# Patient Record
Sex: MALE | Race: WHITE | Employment: OTHER | URBAN - METROPOLITAN AREA
[De-identification: names, ages, dates, MRNs, and addresses within clinical notes are randomized per-mention and may not be internally consistent; named-entity substitution may affect disease eponyms.]

---

## 2017-03-02 PROBLEM — R51.9 NONINTRACTABLE EPISODIC HEADACHE: Status: RESOLVED | Noted: 2017-03-02 | Resolved: 2017-03-02

## 2017-03-02 PROBLEM — R51.9 NONINTRACTABLE EPISODIC HEADACHE: Status: ACTIVE | Noted: 2017-03-02

## 2017-03-03 PROBLEM — K52.9 GASTROENTERITIS: Status: ACTIVE | Noted: 2017-03-03

## 2019-09-23 ENCOUNTER — TELEPHONE (OUTPATIENT)
Dept: CARDIOLOGY CLINIC | Age: 59
End: 2019-09-23

## 2019-10-08 ENCOUNTER — TELEPHONE (OUTPATIENT)
Dept: ADMINISTRATIVE | Age: 59
End: 2019-10-08

## 2022-04-15 ENCOUNTER — APPOINTMENT (OUTPATIENT)
Dept: GENERAL RADIOLOGY | Age: 62
End: 2022-04-15
Payer: COMMERCIAL

## 2022-04-15 ENCOUNTER — HOSPITAL ENCOUNTER (EMERGENCY)
Age: 62
Discharge: HOME | End: 2022-04-15
Attending: STUDENT IN AN ORGANIZED HEALTH CARE EDUCATION/TRAINING PROGRAM
Payer: COMMERCIAL

## 2022-04-15 VITALS
OXYGEN SATURATION: 97 % | HEART RATE: 68 BPM | SYSTOLIC BLOOD PRESSURE: 155 MMHG | DIASTOLIC BLOOD PRESSURE: 94 MMHG | RESPIRATION RATE: 20 BRPM | TEMPERATURE: 98.3 F

## 2022-04-15 DIAGNOSIS — R06.02 SHORTNESS OF BREATH: ICD-10-CM

## 2022-04-15 DIAGNOSIS — R07.9 CHEST PAIN, UNSPECIFIED TYPE: Primary | ICD-10-CM

## 2022-04-15 LAB
ALBUMIN SERPL-MCNC: 4.6 G/DL (ref 3.5–5.2)
ALP BLD-CCNC: 66 U/L (ref 40–129)
ALT SERPL-CCNC: 53 U/L (ref 0–40)
ANION GAP SERPL CALCULATED.3IONS-SCNC: 16 MMOL/L (ref 7–16)
AST SERPL-CCNC: 29 U/L (ref 0–39)
BASOPHILS ABSOLUTE: 0.06 E9/L (ref 0–0.2)
BASOPHILS RELATIVE PERCENT: 0.7 % (ref 0–2)
BILIRUB SERPL-MCNC: 0.7 MG/DL (ref 0–1.2)
BUN BLDV-MCNC: 15 MG/DL (ref 6–23)
CALCIUM SERPL-MCNC: 9.6 MG/DL (ref 8.6–10.2)
CHLORIDE BLD-SCNC: 101 MMOL/L (ref 98–107)
CO2: 20 MMOL/L (ref 22–29)
CREAT SERPL-MCNC: 1.2 MG/DL (ref 0.7–1.2)
D DIMER: <200 NG/ML DDU
EKG ATRIAL RATE: 72 BPM
EKG P AXIS: 50 DEGREES
EKG P-R INTERVAL: 180 MS
EKG Q-T INTERVAL: 402 MS
EKG QRS DURATION: 86 MS
EKG QTC CALCULATION (BAZETT): 440 MS
EKG R AXIS: 6 DEGREES
EKG T AXIS: -15 DEGREES
EKG VENTRICULAR RATE: 72 BPM
EOSINOPHILS ABSOLUTE: 0.12 E9/L (ref 0.05–0.5)
EOSINOPHILS RELATIVE PERCENT: 1.5 % (ref 0–6)
GFR AFRICAN AMERICAN: >60
GFR NON-AFRICAN AMERICAN: >60 ML/MIN/1.73
GLUCOSE BLD-MCNC: 132 MG/DL (ref 74–99)
HCT VFR BLD CALC: 41.9 % (ref 37–54)
HEMOGLOBIN: 14.7 G/DL (ref 12.5–16.5)
IMMATURE GRANULOCYTES #: 0.03 E9/L
IMMATURE GRANULOCYTES %: 0.4 % (ref 0–5)
LYMPHOCYTES ABSOLUTE: 2.86 E9/L (ref 1.5–4)
LYMPHOCYTES RELATIVE PERCENT: 35.5 % (ref 20–42)
MCH RBC QN AUTO: 29.3 PG (ref 26–35)
MCHC RBC AUTO-ENTMCNC: 35.1 % (ref 32–34.5)
MCV RBC AUTO: 83.5 FL (ref 80–99.9)
MONOCYTES ABSOLUTE: 0.52 E9/L (ref 0.1–0.95)
MONOCYTES RELATIVE PERCENT: 6.5 % (ref 2–12)
NEUTROPHILS ABSOLUTE: 4.46 E9/L (ref 1.8–7.3)
NEUTROPHILS RELATIVE PERCENT: 55.4 % (ref 43–80)
PDW BLD-RTO: 12.5 FL (ref 11.5–15)
PLATELET # BLD: 214 E9/L (ref 130–450)
PMV BLD AUTO: 10 FL (ref 7–12)
POTASSIUM SERPL-SCNC: 4 MMOL/L (ref 3.5–5)
PRO-BNP: 29 PG/ML (ref 0–125)
RBC # BLD: 5.02 E12/L (ref 3.8–5.8)
SODIUM BLD-SCNC: 137 MMOL/L (ref 132–146)
TOTAL PROTEIN: 7 G/DL (ref 6.4–8.3)
TROPONIN, HIGH SENSITIVITY: 7 NG/L (ref 0–11)
TROPONIN, HIGH SENSITIVITY: 8 NG/L (ref 0–11)
WBC # BLD: 8.1 E9/L (ref 4.5–11.5)

## 2022-04-15 PROCEDURE — 93005 ELECTROCARDIOGRAM TRACING: CPT | Performed by: PHYSICIAN ASSISTANT

## 2022-04-15 PROCEDURE — 36415 COLL VENOUS BLD VENIPUNCTURE: CPT

## 2022-04-15 PROCEDURE — 84484 ASSAY OF TROPONIN QUANT: CPT

## 2022-04-15 PROCEDURE — 85025 COMPLETE CBC W/AUTO DIFF WBC: CPT

## 2022-04-15 PROCEDURE — 93010 ELECTROCARDIOGRAM REPORT: CPT | Performed by: INTERNAL MEDICINE

## 2022-04-15 PROCEDURE — 71046 X-RAY EXAM CHEST 2 VIEWS: CPT

## 2022-04-15 PROCEDURE — 93005 ELECTROCARDIOGRAM TRACING: CPT | Performed by: STUDENT IN AN ORGANIZED HEALTH CARE EDUCATION/TRAINING PROGRAM

## 2022-04-15 PROCEDURE — 80053 COMPREHEN METABOLIC PANEL: CPT

## 2022-04-15 PROCEDURE — 83880 ASSAY OF NATRIURETIC PEPTIDE: CPT

## 2022-04-15 PROCEDURE — 6360000002 HC RX W HCPCS: Performed by: STUDENT IN AN ORGANIZED HEALTH CARE EDUCATION/TRAINING PROGRAM

## 2022-04-15 PROCEDURE — 99283 EMERGENCY DEPT VISIT LOW MDM: CPT

## 2022-04-15 PROCEDURE — 85378 FIBRIN DEGRADE SEMIQUANT: CPT

## 2022-04-15 PROCEDURE — 96374 THER/PROPH/DIAG INJ IV PUSH: CPT

## 2022-04-15 RX ORDER — IBUPROFEN 800 MG/1
800 TABLET ORAL 3 TIMES DAILY PRN
Qty: 40 TABLET | Refills: 0 | Status: SHIPPED | OUTPATIENT
Start: 2022-04-15

## 2022-04-15 RX ORDER — KETOROLAC TROMETHAMINE 30 MG/ML
15 INJECTION, SOLUTION INTRAMUSCULAR; INTRAVENOUS ONCE
Status: COMPLETED | OUTPATIENT
Start: 2022-04-15 | End: 2022-04-15

## 2022-04-15 RX ADMIN — KETOROLAC TROMETHAMINE 15 MG: 30 INJECTION, SOLUTION INTRAMUSCULAR at 22:08

## 2022-04-15 ASSESSMENT — PAIN SCALES - GENERAL
PAINLEVEL_OUTOF10: 2
PAINLEVEL_OUTOF10: 6

## 2022-04-15 ASSESSMENT — PAIN - FUNCTIONAL ASSESSMENT: PAIN_FUNCTIONAL_ASSESSMENT: 0-10

## 2022-04-15 NOTE — ED NOTES
FIRST PROVIDER CONTACT ASSESSMENT NOTE           Department of Emergency Medicine                 First Provider Note            4/15/22  1:53 PM EDT    Date of Encounter: No admission date for patient encounter. Patient Name: Maya Gunn  : 1960  MRN: [id number]    Chief Complaint: No chief complaint on file. History of Present Illness:   Maya Gunn is a 64 y.o. male who presents to the ED for chest pain that started one month ago, worse last night. Reports shortness of breath as well. Focused Physical Exam:  VS:    ED Triage Vitals [04/15/22 1350]   BP Temp Temp src Pulse Resp SpO2 Height Weight   -- -- -- 78 -- 97 % -- --        Physical Ex: Constitutional: Alert and non-toxic. Medical History:  has a past medical history of Concussion, Kidney stone, and Umbilical hernia. Surgical History:  has a past surgical history that includes Colonoscopy; colectomy (2016); and hernia repair (2016). Social History:  reports that he has never smoked. He has quit using smokeless tobacco. He reports current alcohol use. He reports that he does not use drugs. Family History: family history is not on file. Allergies: Patient has no known allergies.      Initial Plan of Care: Initiate Treatment-Testing, Proceed toTreatment Area When Bed Available for ED Attending/MLP to Continue Care      ---END OF FIRST PROVIDER CONTACT ASSESSMENT NOTE---  Electronically signed by ORESTES Manzanares   DD: 4/15/22       ORESTES Manzanares  04/15/22 [id number]

## 2022-04-16 NOTE — ED PROVIDER NOTES
Department of Emergency Medicine   ED  Provider Note  Admit Date/RoomTime: 4/15/2022  7:36 PM  ED Room: 10/10    History of Present Illness:  4/15/22, Time: 8:01 PM EDT  Chief Complaint   Patient presents with    Chest Pain     cp/sob x 1 month started getting worse lastnight . denies any cardiac hx     Shortness of Breath     Sean Padilla is a 64 y.o. male presenting to the ED for Chest pain with shortness of breath. This started in January. Has been worse for the past month. It is exertional.  He has associated chest tightness on the left side. Nothing is better or worse. At however worse most recently last week when he was in [place] with exertion while he was mowing the lawn. He has to rest frequently because of this. Patient denies any nausea or emesis with it. He does have diaphoresis with it. Patient has no cardiac history however he is a diabetic. Of note, the patient has no history of DVT or PE in the past.  No hemoptysis. No leg swelling. There is no history of tobacco use or hormone use    Review of Systems:  Review of systems obtained and negative unless stated otherwise above in the HPI.    --------------------------------------------- PAST HISTORY ---------------------------------------------  Past Medical History:  has a past medical history of Concussion, Kidney stone, and Umbilical hernia. Past Surgical History:  has a past surgical history that includes Colonoscopy; colectomy (01/19/2016); and hernia repair (01/19/2016). Social History:  reports that he has never smoked. He has quit using smokeless tobacco. He reports current alcohol use. He reports that he does not use drugs. Family History: family history is not on file. . Unless otherwise noted, family history is non contributory    The patients home medications have been reviewed. Allergies: Patient has no known allergies.     I have reviewed the past medical history, past surgical history, social history, and family history    ---------------------------------------------------PHYSICAL EXAM--------------------------------------    Constitutional: Appears in no distress  Head: Normocephalic, atraumatic  Eyes: Non-icteric slcera, no conjunctival injection  ENT: Moist mucous membranes,  Neck: Trachea midline, no JVD  Respiratory: Nonlabored respirations. Lungs clear to auscultation bilaterally, no wheezes, rales, or rhonchi. Cardiovascular: Regular rate. Regular rhythm. No murmurs, no gallops, no rubs. Gastrointestinal: Abdomen Soft, Non tender, Non distended. No rebound tenderness, guarding, or rigidity. Extremities: No lower extremity edema  Genitourinary: No CVA tenderness, no suprapubic tenderness  Musculoskeletal: Moves all extremities, no deformity  Skin: Pink, warm, dry without rash. Neurologic: Alert, symmetric facies, no aphasia    -------------------------------------------------- RESULTS -------------------------------------------------  I have personally reviewed all laboratory and imaging results for this patient. Results are listed below.      LABS: (Lab results interpreted by me)  Results for orders placed or performed during the hospital encounter of 04/15/22   CBC with Auto Differential   Result Value Ref Range    WBC 8.1 4.5 - 11.5 E9/L    RBC 5.02 3.80 - 5.80 E12/L    Hemoglobin 14.7 12.5 - 16.5 g/dL    Hematocrit 41.9 37.0 - 54.0 %    MCV 83.5 80.0 - 99.9 fL    MCH 29.3 26.0 - 35.0 pg    MCHC 35.1 (H) 32.0 - 34.5 %    RDW 12.5 11.5 - 15.0 fL    Platelets 640 685 - 893 E9/L    MPV 10.0 7.0 - 12.0 fL    Neutrophils % 55.4 43.0 - 80.0 %    Immature Granulocytes % 0.4 0.0 - 5.0 %    Lymphocytes % 35.5 20.0 - 42.0 %    Monocytes % 6.5 2.0 - 12.0 %    Eosinophils % 1.5 0.0 - 6.0 %    Basophils % 0.7 0.0 - 2.0 %    Neutrophils Absolute 4.46 1.80 - 7.30 E9/L    Immature Granulocytes # 0.03 E9/L    Lymphocytes Absolute 2.86 1.50 - 4.00 E9/L    Monocytes Absolute 0.52 0.10 - 0.95 E9/L    Eosinophils Absolute 0. 12 0.05 - 0.50 E9/L    Basophils Absolute 0.06 0.00 - 0.20 E9/L   Comprehensive Metabolic Panel   Result Value Ref Range    Sodium 137 132 - 146 mmol/L    Potassium 4.0 3.5 - 5.0 mmol/L    Chloride 101 98 - 107 mmol/L    CO2 20 (L) 22 - 29 mmol/L    Anion Gap 16 7 - 16 mmol/L    Glucose 132 (H) 74 - 99 mg/dL    BUN 15 6 - 23 mg/dL    CREATININE 1.2 0.7 - 1.2 mg/dL    GFR Non-African American >60 >=60 mL/min/1.73    GFR African American >60     Calcium 9.6 8.6 - 10.2 mg/dL    Total Protein 7.0 6.4 - 8.3 g/dL    Albumin 4.6 3.5 - 5.2 g/dL    Total Bilirubin 0.7 0.0 - 1.2 mg/dL    Alkaline Phosphatase 66 40 - 129 U/L    ALT 53 (H) 0 - 40 U/L    AST 29 0 - 39 U/L   Troponin   Result Value Ref Range    Troponin, High Sensitivity 7 0 - 11 ng/L   Brain Natriuretic Peptide   Result Value Ref Range    Pro-BNP 29 0 - 125 pg/mL   Troponin   Result Value Ref Range    Troponin, High Sensitivity 8 0 - 11 ng/L   D-Dimer, Quantitative   Result Value Ref Range    D-Dimer, Quant <200 ng/mL DDU   EKG 12 Lead   Result Value Ref Range    Ventricular Rate 72 BPM    Atrial Rate 72 BPM    P-R Interval 180 ms    QRS Duration 86 ms    Q-T Interval 402 ms    QTc Calculation (Bazett) 440 ms    P Axis 50 degrees    R Axis 6 degrees    T Axis -15 degrees   EKG 12 Lead   Result Value Ref Range    Ventricular Rate 73 BPM    Atrial Rate 73 BPM    P-R Interval 188 ms    QRS Duration 90 ms    Q-T Interval 412 ms    QTc Calculation (Bazett) 453 ms    P Axis 47 degrees    R Axis -6 degrees    T Axis 9 degrees   ,       RADIOLOGY:  Interpreted by Radiologist unless otherwise specified  XR CHEST (2 VW)   Final Result   No acute process.       Focal scarring at the left lung base.               ------------------------- NURSING NOTES AND VITALS REVIEWED ---------------------------   The nursing notes within the ED encounter and vital signs as below have been reviewed by myself  BP (!) 142/114   Pulse 81   Temp 98.3 °F (36.8 °C)   Resp 18   SpO2 98% emergency provider has spoken with the patient and discussed todays results, in addition to providing specific details for the plan of care and counseling regarding the diagnosis and prognosis. Questions are answered at this time and they are agreeable with the plan.       --------------------------------- IMPRESSION AND DISPOSITION ---------------------------------    IMPRESSION  1. Chest pain, unspecified type    2. Shortness of breath        DISPOSITION  Disposition: Discharge to home  Patient condition is stable    Dr. Melissa QUACH, am the primary provider of record    Melissa Graham DO  Emergency Medicine    NOTE: This report was transcribed using voice recognition software.  Every effort was made to ensure accuracy; however, inadvertent computerized transcription errors may be present         Leida Hebert DO  04/15/22 3043

## 2022-04-16 NOTE — ED NOTES
Pt states had relief after med denies pain at this time discharge instructions given to pt verbalized understanding ambulates with steady gait     Caesar Collazo RN  04/15/22 [id number]

## 2022-04-16 NOTE — ED NOTES
Pt alert oriented skin warm dry resp easy lungs cta pt c/o pain in chest  Pt vague with description     Ashlynt Giuliano, RN  04/15/22 [address], RN  04/15/22 8789

## 2022-04-18 LAB
EKG ATRIAL RATE: 73 BPM
EKG P AXIS: 50 DEGREES
EKG P-R INTERVAL: 188 MS
EKG Q-T INTERVAL: 402 MS
EKG QRS DURATION: 88 MS
EKG QTC CALCULATION (BAZETT): 442 MS
EKG R AXIS: -7 DEGREES
EKG T AXIS: 6 DEGREES
EKG VENTRICULAR RATE: 73 BPM

## 2022-07-14 ENCOUNTER — HOSPITAL ENCOUNTER (OUTPATIENT)
Age: 62
Discharge: HOME | End: 2022-07-16

## 2022-07-14 PROCEDURE — 88305 TISSUE EXAM BY PATHOLOGIST: CPT

## 2022-09-20 ENCOUNTER — HOSPITAL ENCOUNTER (EMERGENCY)
Age: 62
Discharge: HOME | End: 2022-09-20
Attending: EMERGENCY MEDICINE
Payer: COMMERCIAL

## 2022-09-20 ENCOUNTER — APPOINTMENT (OUTPATIENT)
Dept: GENERAL RADIOLOGY | Age: 62
End: 2022-09-20
Payer: COMMERCIAL

## 2022-09-20 VITALS
TEMPERATURE: 97.2 F | BODY MASS INDEX: 31.1 KG/M2 | HEIGHT: 69 IN | RESPIRATION RATE: 20 BRPM | SYSTOLIC BLOOD PRESSURE: 160 MMHG | WEIGHT: 210 LBS | OXYGEN SATURATION: 94 % | DIASTOLIC BLOOD PRESSURE: 90 MMHG | HEART RATE: 99 BPM

## 2022-09-20 DIAGNOSIS — R06.00 DYSPNEA, UNSPECIFIED TYPE: Primary | ICD-10-CM

## 2022-09-20 LAB
ACETAMINOPHEN LEVEL: <5 MCG/ML (ref 10–30)
ALBUMIN SERPL-MCNC: 4.7 G/DL (ref 3.5–5.2)
ALP BLD-CCNC: 100 U/L (ref 40–129)
ALT SERPL-CCNC: 64 U/L (ref 0–40)
ANION GAP SERPL CALCULATED.3IONS-SCNC: 17 MMOL/L (ref 7–16)
AST SERPL-CCNC: 37 U/L (ref 0–39)
BASOPHILS ABSOLUTE: 0.05 E9/L (ref 0–0.2)
BASOPHILS RELATIVE PERCENT: 1 % (ref 0–2)
BILIRUB SERPL-MCNC: 0.4 MG/DL (ref 0–1.2)
BUN BLDV-MCNC: 9 MG/DL (ref 6–23)
CALCIUM SERPL-MCNC: 9.1 MG/DL (ref 8.6–10.2)
CHLORIDE BLD-SCNC: 97 MMOL/L (ref 98–107)
CO2: 21 MMOL/L (ref 22–29)
CREAT SERPL-MCNC: 1.1 MG/DL (ref 0.7–1.2)
D DIMER: <200 NG/ML DDU
EKG ATRIAL RATE: 108 BPM
EKG P AXIS: 48 DEGREES
EKG P-R INTERVAL: 178 MS
EKG Q-T INTERVAL: 358 MS
EKG QRS DURATION: 92 MS
EKG QTC CALCULATION (BAZETT): 479 MS
EKG R AXIS: 4 DEGREES
EKG T AXIS: -6 DEGREES
EKG VENTRICULAR RATE: 108 BPM
EOSINOPHILS ABSOLUTE: 0.05 E9/L (ref 0.05–0.5)
EOSINOPHILS RELATIVE PERCENT: 1 % (ref 0–6)
ETHANOL: 80 MG/DL (ref 0–0.08)
GFR AFRICAN AMERICAN: >60
GFR NON-AFRICAN AMERICAN: >60 ML/MIN/1.73
GLUCOSE BLD-MCNC: 312 MG/DL (ref 74–99)
HCT VFR BLD CALC: 43.6 % (ref 37–54)
HEMOGLOBIN: 15.2 G/DL (ref 12.5–16.5)
IMMATURE GRANULOCYTES #: 0.06 E9/L
IMMATURE GRANULOCYTES %: 1.3 % (ref 0–5)
INR BLD: 0.9
LYMPHOCYTES ABSOLUTE: 1.49 E9/L (ref 1.5–4)
LYMPHOCYTES RELATIVE PERCENT: 31 % (ref 20–42)
MCH RBC QN AUTO: 29.6 PG (ref 26–35)
MCHC RBC AUTO-ENTMCNC: 34.9 % (ref 32–34.5)
MCV RBC AUTO: 85 FL (ref 80–99.9)
MONOCYTES ABSOLUTE: 0.25 E9/L (ref 0.1–0.95)
MONOCYTES RELATIVE PERCENT: 5.2 % (ref 2–12)
NEUTROPHILS ABSOLUTE: 2.9 E9/L (ref 1.8–7.3)
NEUTROPHILS RELATIVE PERCENT: 60.5 % (ref 43–80)
PDW BLD-RTO: 12.4 FL (ref 11.5–15)
PLATELET # BLD: 216 E9/L (ref 130–450)
PMV BLD AUTO: 9.6 FL (ref 7–12)
POTASSIUM REFLEX MAGNESIUM: 3.9 MMOL/L (ref 3.5–5)
PRO-BNP: 39 PG/ML (ref 0–125)
PROTHROMBIN TIME: 10 SEC (ref 9.3–12.4)
RBC # BLD: 5.13 E12/L (ref 3.8–5.8)
SALICYLATE, SERUM: <0.3 MG/DL (ref 0–30)
SARS-COV-2, NAAT: NOT DETECTED
SODIUM BLD-SCNC: 135 MMOL/L (ref 132–146)
TOTAL PROTEIN: 7.4 G/DL (ref 6.4–8.3)
TRICYCLIC ANTIDEPRESSANTS SCREEN SERUM: NEGATIVE NG/ML
TROPONIN, HIGH SENSITIVITY: 7 NG/L (ref 0–11)
WBC # BLD: 4.8 E9/L (ref 4.5–11.5)

## 2022-09-20 PROCEDURE — 93005 ELECTROCARDIOGRAM TRACING: CPT

## 2022-09-20 PROCEDURE — 87635 SARS-COV-2 COVID-19 AMP PRB: CPT

## 2022-09-20 PROCEDURE — 71045 X-RAY EXAM CHEST 1 VIEW: CPT

## 2022-09-20 PROCEDURE — 80143 DRUG ASSAY ACETAMINOPHEN: CPT

## 2022-09-20 PROCEDURE — 80179 DRUG ASSAY SALICYLATE: CPT

## 2022-09-20 PROCEDURE — 82077 ASSAY SPEC XCP UR&BREATH IA: CPT

## 2022-09-20 PROCEDURE — 84484 ASSAY OF TROPONIN QUANT: CPT

## 2022-09-20 PROCEDURE — 80307 DRUG TEST PRSMV CHEM ANLYZR: CPT

## 2022-09-20 PROCEDURE — 99285 EMERGENCY DEPT VISIT HI MDM: CPT

## 2022-09-20 PROCEDURE — 85610 PROTHROMBIN TIME: CPT

## 2022-09-20 PROCEDURE — 85025 COMPLETE CBC W/AUTO DIFF WBC: CPT

## 2022-09-20 PROCEDURE — 83880 ASSAY OF NATRIURETIC PEPTIDE: CPT

## 2022-09-20 PROCEDURE — 80053 COMPREHEN METABOLIC PANEL: CPT

## 2022-09-20 PROCEDURE — 6370000000 HC RX 637 (ALT 250 FOR IP)

## 2022-09-20 PROCEDURE — 85378 FIBRIN DEGRADE SEMIQUANT: CPT

## 2022-09-20 PROCEDURE — 36415 COLL VENOUS BLD VENIPUNCTURE: CPT

## 2022-09-20 RX ORDER — ACETAMINOPHEN 500 MG
1000 TABLET ORAL ONCE
Status: COMPLETED | OUTPATIENT
Start: 2022-09-20 | End: 2022-09-20

## 2022-09-20 RX ORDER — FAMOTIDINE 20 MG/1
20 TABLET, FILM COATED ORAL 2 TIMES DAILY
Qty: 28 TABLET | Refills: 0 | Status: SHIPPED | OUTPATIENT
Start: 2022-09-20 | End: 2022-10-04

## 2022-09-20 RX ORDER — FAMOTIDINE 20 MG/1
20 TABLET, FILM COATED ORAL ONCE
Status: COMPLETED | OUTPATIENT
Start: 2022-09-20 | End: 2022-09-20

## 2022-09-20 RX ORDER — FAMOTIDINE 20 MG/1
20 TABLET, FILM COATED ORAL 2 TIMES DAILY
Qty: 28 TABLET | Refills: 0 | Status: SHIPPED | OUTPATIENT
Start: 2022-09-20 | End: 2022-09-20

## 2022-09-20 RX ADMIN — ACETAMINOPHEN 1000 MG: 500 TABLET ORAL at 06:41

## 2022-09-20 RX ADMIN — FAMOTIDINE 20 MG: 20 TABLET, FILM COATED ORAL at 06:41

## 2022-09-20 ASSESSMENT — PAIN DESCRIPTION - LOCATION: LOCATION: HEAD

## 2022-09-20 ASSESSMENT — ENCOUNTER SYMPTOMS
BACK PAIN: 0
CHEST TIGHTNESS: 1
ABDOMINAL PAIN: 0
SORE THROAT: 0
PHOTOPHOBIA: 0
VOMITING: 0
EYE DISCHARGE: 0
SHORTNESS OF BREATH: 1
DIARRHEA: 0
COUGH: 1
BLOOD IN STOOL: 0
SINUS PRESSURE: 0
ABDOMINAL DISTENTION: 0
NAUSEA: 0
WHEEZING: 0
RHINORRHEA: 0
CONSTIPATION: 0
TROUBLE SWALLOWING: 1
EYE REDNESS: 0

## 2022-09-20 ASSESSMENT — PAIN - FUNCTIONAL ASSESSMENT: PAIN_FUNCTIONAL_ASSESSMENT: NONE - DENIES PAIN

## 2022-09-20 ASSESSMENT — PAIN SCALES - GENERAL: PAINLEVEL_OUTOF10: 7

## 2022-09-20 NOTE — ED NOTES
Vivienne GERBER advised of need for room asap due to patient c/o burning in throat and SOB     Dirk Thompson RN  09/20/22 9635

## 2022-09-20 NOTE — DISCHARGE INSTRUCTIONS
Please take your medications as they are prescribed. It is important that you follow-up with your primary care physician to follow-up on your symptoms and discuss your recent emergency room visit. Please return to the emergency room if you experience worsening of her symptoms, severe shortness of breath, severe chest pain, severe headache, severe nausea with vomiting, severe abdominal pain, or fevers greater than 100.4 °F.

## 2023-08-15 NOTE — ED PROVIDER NOTES
[facility]  Department of Emergency Medicine     Written by: Lion Connor MD  Patient Name: Danny Patel  Attending Provider: Ruth Jensen DO  Admit Date: 2022  4:20 AM  MRN: [id number]                   : 1960        Chief Complaint   Patient presents with    Shortness of Breath    - Chief complaint    Patient is a 19-year-old male with a past medical history of diabetes presenting with shortness of breath. Patient woke up from sleep approximately at 3 AM morning feeling very short of breath and a feeling of chest tightness and a feeling that he cannot swallow. Patient shortness of breath is worsened by laying flat, exertion, improved with rest.  Patient's chest discomfort is located substernally, nonradiating. Patient has associated cough and feeling like he cannot swallow. Patient has never had the symptoms before. Patient denies prior cardiac history. Patient denies smoking history, admits to 5 alcoholic beverages a day, denies drug use. Patient denies headache, fever, cough, sore throat, nausea, vomiting, diaphoresis, neck pain, arm pain, back pain, numbness, weakness, lightheadedness, dizziness, loss of sensation, abdominal pain, hematuria, dysuria, increasing or decreasing urinary frequency, blood in the stool, constipation, diarrhea, leg pain, leg swelling, anticoagulation, recent travel, recent sickness, recent surgery, or sick contacts. Review of Systems   Constitutional:  Negative for activity change, chills, fatigue and fever. HENT:  Positive for trouble swallowing. Negative for congestion, postnasal drip, rhinorrhea, sinus pressure and sore throat. Eyes:  Negative for photophobia, discharge, redness and visual disturbance. Respiratory:  Positive for cough, chest tightness and shortness of breath. Negative for wheezing. Cardiovascular:  Negative for chest pain, palpitations and leg swelling.    Gastrointestinal:  Negative for abdominal distention, abdominal pain, blood in stool, constipation, diarrhea, nausea and vomiting. Endocrine: Negative for cold intolerance and heat intolerance. Genitourinary:  Negative for decreased urine volume, difficulty urinating, dysuria, flank pain, frequency, hematuria and urgency. Musculoskeletal:  Negative for arthralgias, back pain, joint swelling and myalgias. Skin:  Negative for rash and wound. Allergic/Immunologic: Negative for immunocompromised state. Neurological:  Negative for dizziness, syncope, facial asymmetry, weakness, light-headedness, numbness and headaches. Hematological:  Does not bruise/bleed easily. Psychiatric/Behavioral:  Negative for behavioral problems and hallucinations. Physical Exam  Constitutional:       General: He is not in acute distress. Appearance: Normal appearance. He is ill-appearing. He is not toxic-appearing or diaphoretic. HENT:      Head: Normocephalic and atraumatic. Right Ear: Hearing normal.      Left Ear: Hearing normal.      Nose: Nose normal.      Mouth/Throat:      Lips: Pink. Mouth: Mucous membranes are moist.      Pharynx: Oropharynx is clear. Eyes:      Extraocular Movements: Extraocular movements intact. Conjunctiva/sclera: Conjunctivae normal.      Pupils: Pupils are equal, round, and reactive to light. Cardiovascular:      Rate and Rhythm: Normal rate and regular rhythm. Pulses: Normal pulses. Radial pulses are 2+ on the right side and 2+ on the left side. Posterior tibial pulses are 2+ on the right side and 2+ on the left side. Heart sounds: S1 normal and S2 normal.   Pulmonary:      Effort: Tachypnea and accessory muscle usage present. No respiratory distress. Breath sounds: Normal air entry. Examination of the right-upper field reveals decreased breath sounds and rales. Examination of the right-middle field reveals decreased breath sounds and rales.  Examination of the right-lower field reveals decreased breath sounds and rales. Decreased breath sounds and rales present. No wheezing or rhonchi. Abdominal:      General: Abdomen is flat. Bowel sounds are normal. There is no distension. Palpations: Abdomen is soft. There is no fluid wave or mass. Tenderness: There is no abdominal tenderness. There is no right CVA tenderness, left CVA tenderness or guarding. Musculoskeletal:         General: No swelling or tenderness. Normal range of motion. Cervical back: Normal range of motion and neck supple. No rigidity. Right lower leg: No edema. Left lower leg: No edema. Lymphadenopathy:      Cervical: No cervical adenopathy. Skin:     General: Skin is warm and dry. Capillary Refill: Capillary refill takes less than 2 seconds. Findings: No bruising, lesion or rash. Neurological:      General: No focal deficit present. Mental Status: He is alert and oriented to person, place, and time. Mental status is at baseline. Motor: No weakness. Psychiatric:         Attention and Perception: Attention normal.         Mood and Affect: Mood and affect normal.         Behavior: Behavior is cooperative. EKG Interpretation    Interpreted by emergency department physician    Rhythm: sinus tachycardia  Rate: tachycardia  Axis: normal  Ectopy: none  Conduction: normal  ST Segments: nonspecific changes  T Waves: non specific changes and inversion in  III  Q Waves: none    Clinical Impression: no acute changes when compared with prior on 4.15.22    Procedures       MDM  Number of Diagnoses or Management Options  Dyspnea, unspecified type  Diagnosis management comments: Patient is a 70-year-old male with a past medical history of diabetes presenting with shortness of breath. Initial examination the patient appeared tachypneic, tachycardic, and hypertensive. Patient is saturating well on 3 L nasal cannula.   CBC, COVID, initial troponin, proBNP, PT/INR, D-dimer, and chest x-ray are all reassuring. CMP shows bicarb of 21, anion gap of 17, and an ALT of 64. EKG as above. Patient was reevaluated and is resting more comfortably now on room air saturating at 94%. Patient was explained the results of his blood work and his imaging and demonstrated good understanding. Patient is hemodynamically stable at this time and no longer complaining of shortness of breath or difficulty swallowing. Patient is now endorsing a headache and has excessive burping. We will give him Tylenol and Pepcid and reevaluate him prior to discharge. Patient signed out to my colleague Shahzad Carreno. On reevaluation patient is feeling comfortable going on and following up outpatient. At the time of discharge, patient demonstrated good understanding of his condition, had no questions, and was hemodynamically stable. ED Course as of 09/20/22 1821 Tue Sep 20, 2022   0502 Was reevaluated and is resting little more comfortably at this point. Patient was educated about the results of his blood work and his imaging at this time and demonstrated good understanding. Patient is endorsing headache and excessive burping at this time. We will give her Tylenol and Pepcid for her symptoms. [VG]      ED Course User Index  [VG] Griselda Siddiqi MD       --------------------------------------------- PAST HISTORY ---------------------------------------------  Past Medical History:  has a past medical history of Concussion, Kidney stone, and Umbilical hernia. Past Surgical History:  has a past surgical history that includes Colonoscopy; colectomy (01/19/2016); and hernia repair (01/19/2016). Social History:  reports that he has never smoked. He has quit using smokeless tobacco. He reports current alcohol use. He reports that he does not use drugs. Family History: family history is not on file. The patients home medications have been reviewed.     Allergies: Patient has no known allergies.     -------------------------------------------------- RESULTS -------------------------------------------------  Labs:  Results for orders placed or performed during the hospital encounter of 09/20/22   COVID-19, Rapid    Specimen: Nasopharyngeal Swab   Result Value Ref Range    SARS-CoV-2, NAAT Not Detected Not Detected   CBC with Auto Differential   Result Value Ref Range    WBC 4.8 4.5 - 11.5 E9/L    RBC 5.13 3.80 - 5.80 E12/L    Hemoglobin 15.2 12.5 - 16.5 g/dL    Hematocrit 43.6 37.0 - 54.0 %    MCV 85.0 80.0 - 99.9 fL    MCH 29.6 26.0 - 35.0 pg    MCHC 34.9 (H) 32.0 - 34.5 %    RDW 12.4 11.5 - 15.0 fL    Platelets 134 133 - 973 E9/L    MPV 9.6 7.0 - 12.0 fL    Neutrophils % 60.5 43.0 - 80.0 %    Immature Granulocytes % 1.3 0.0 - 5.0 %    Lymphocytes % 31.0 20.0 - 42.0 %    Monocytes % 5.2 2.0 - 12.0 %    Eosinophils % 1.0 0.0 - 6.0 %    Basophils % 1.0 0.0 - 2.0 %    Neutrophils Absolute 2.90 1.80 - 7.30 E9/L    Immature Granulocytes # 0.06 E9/L    Lymphocytes Absolute 1.49 (L) 1.50 - 4.00 E9/L    Monocytes Absolute 0.25 0.10 - 0.95 E9/L    Eosinophils Absolute 0.05 0.05 - 0.50 E9/L    Basophils Absolute 0.05 0.00 - 0.20 E9/L   Comprehensive Metabolic Panel w/ Reflex to MG   Result Value Ref Range    Sodium 135 132 - 146 mmol/L    Potassium reflex Magnesium 3.9 3.5 - 5.0 mmol/L    Chloride 97 (L) 98 - 107 mmol/L    CO2 21 (L) 22 - 29 mmol/L    Anion Gap 17 (H) 7 - 16 mmol/L    Glucose 312 (H) 74 - 99 mg/dL    BUN 9 6 - 23 mg/dL    Creatinine 1.1 0.7 - 1.2 mg/dL    GFR Non-African American >60 >=60 mL/min/1.73    GFR African American >60     Calcium 9.1 8.6 - 10.2 mg/dL    Total Protein 7.4 6.4 - 8.3 g/dL    Albumin 4.7 3.5 - 5.2 g/dL    Total Bilirubin 0.4 0.0 - 1.2 mg/dL    Alkaline Phosphatase 100 40 - 129 U/L    ALT 64 (H) 0 - 40 U/L    AST 37 0 - 39 U/L   Troponin   Result Value Ref Range    Troponin, High Sensitivity 7 0 - 11 ng/L   Brain Natriuretic Peptide   Result Value Ref Range    Pro-BNP 39 0 - 125 pg/mL   Protime-INR   Result Value Ref Range    Protime 10.0 9.3 - 12.4 sec    INR 0.9    Serum Drug Screen   Result Value Ref Range    Ethanol Lvl 80 mg/dL    Acetaminophen Level <5.0 (L) 10.0 - 02.0 mcg/mL    Salicylate, Serum <8.6 0.0 - 30.0 mg/dL    TCA Scrn NEGATIVE Cutoff:300 ng/mL   D-Dimer, Quantitative   Result Value Ref Range    D-Dimer, Quant <200 ng/mL DDU   EKG 12 Lead   Result Value Ref Range    Ventricular Rate 108 BPM    Atrial Rate 108 BPM    P-R Interval 178 ms    QRS Duration 92 ms    Q-T Interval 358 ms    QTc Calculation (Bazett) 479 ms    P Axis 48 degrees    R Axis 4 degrees    T Axis -6 degrees       Radiology:  XR CHEST PORTABLE   Final Result   No acute cardiopulmonary process. ------------------------- NURSING NOTES AND VITALS REVIEWED ---------------------------  Date / Time Roomed:  9/20/2022  4:20 AM  ED Bed Assignment:  06/06    The nursing notes within the ED encounter and vital signs as below have been reviewed. BP (!) 160/90   Pulse 99   Temp 97.2 °F (36.2 °C) (Temporal)   Resp 20   Ht 5' 9\" (1.753 m)   Wt 210 lb (95.3 kg)   SpO2 94%   BMI 31.01 kg/m²   Oxygen Saturation Interpretation: Normal      ------------------------------------------ PROGRESS NOTES ------------------------------------------  I have spoken with the patient and discussed todays results, in addition to providing specific details for the plan of care and counseling regarding the diagnosis and prognosis. Their questions are answered at this time and they are agreeable with the plan. I discussed at length with them reasons for immediate return here for re evaluation. They will followup with primary care by calling their office tomorrow. --------------------------------- ADDITIONAL PROVIDER NOTES ---------------------------------  At this time the patient is without objective evidence of an acute process requiring hospitalization or inpatient management.   They have remained hemodynamically stable throughout their entire ED visit and are stable for discharge with outpatient follow-up. The plan has been discussed in detail and they are aware of the specific conditions for emergent return, as well as the importance of follow-up. Discharge Medication List as of 2022  7:30 AM          Diagnosis:  1. Dyspnea, unspecified type        Disposition:  Patient's disposition: Discharge to home  Patient's condition is stable. Patient was seen and evaluated by myself and my attending Royetta Apgar, DO. Assessment and Plan discussed with attending provider, please see attestation for final plan of care.      MD Doretha Wills MD  Resident  [phone] Ketoconazole Counseling:   Patient counseled regarding improving absorption with orange juice.  Adverse effects include but are not limited to breast enlargement, headache, diarrhea, nausea, upset stomach, liver function test abnormalities, taste disturbance, and stomach pain.  There is a rare possibility of liver failure that can occur when taking ketoconazole. The patient understands that monitoring of LFTs may be required, especially at baseline. The patient verbalized understanding of the proper use and possible adverse effects of ketoconazole.  All of the patient's questions and concerns were addressed.

## 2024-04-15 ENCOUNTER — HOSPITAL ENCOUNTER (OUTPATIENT)
Dept: DIABETES SERVICES | Age: 64
Setting detail: THERAPIES SERIES
Discharge: HOME | End: 2024-04-15
Payer: COMMERCIAL

## 2024-04-15 PROCEDURE — G0108 DIAB MANAGE TRN  PER INDIV: HCPCS

## 2024-04-15 SDOH — ECONOMIC STABILITY: FOOD INSECURITY: ADDITIONAL INFORMATION: NO

## 2024-04-15 ASSESSMENT — PROBLEM AREAS IN DIABETES QUESTIONNAIRE (PAID)
WORRYING ABOUT THE FUTURE AND THE POSSIBILITY OF SERIOUS COMPLICATIONS: MINOR PROBLEM
FEELING DEPRESSED WHEN YOU THINK ABOUT LIVING WITH DIABETES: NOT A PROBLEM
FEELING SCARED WHEN YOU THINK ABOUT LIVING WITH DIABETES: NOT A PROBLEM
PAID-5 TOTAL SCORE: 1
COPING WITH COMPLICATIONS OF DIABETES: NOT A PROBLEM
FEELING THAT DIABETES IS TAKING UP TOO MUCH OF YOUR MENTAL AND PHYSICAL ENERGY EVERY DAY: NOT A PROBLEM

## 2024-04-15 NOTE — PROGRESS NOTES
Diabetes Self-Management Education Record    Participant Name: Isauro Smyth  Referring Provider: Naga Lombardi MD  Assessment/Evaluation Ratings:  1=Needs Instruction   4=Demonstrates Understanding/Competency  2=Needs Review   NC=Not Covered    3=Comprehends Key Points  N/A=Not Applicable  Topics/Learning Objectives Pre-session Assess Date:  Instructor initials/date  4/15/24 KMS Instruction provided    4/15/24 KMS Yearly follow-up/  reinforcement date. Post- session Eval Comments   Diabetes disease process & Treatment process:   -Define type of diabetes in simple terms.  - Describe the ABCs of  diabetes management  -Identify own type of diabetes  -Identify lifestyle changes/treatment options  -other:  2 [x] All     []  []  []  []  []  4 4/15/24 KMS  Type 2 DM since 2016  A1C 8.6%   Developing strategies for Healthy coping/psychosocial issues:    -Describe feelings about living with diabetes  -Identify coping strategies and sources of stress  -Identify support needed & support network available  -Complete PAID-5 Diabetes questionnaire 1 [x] All     []  []  []    []  4 Date: 4/15/24 KMS  PAID-5 Score: 1  Confidence Score: 6           Prevention, detection & treatment of Chronic complications:    -Identify the prevention, detection and treatment for complications including immunizations, preventive eye, foot, dental and renal exams as indicated per the participant's duration of diabetes and health status.  -Define the natural course of diabetes and the relationship of blood glucose levels to long term complications of diabetes.  1 [x] All     []            []  4    Prevention, detection & treatment of acute complications:    -State the causes,signs & symptoms of hyper & hypoglycemia, and prevention & treatment strategies.   -Describe sick day guidelines  DKA /indications for ketone testing &  when to call physician  1 [x] All     []      []    4              -Identify severe weather/situation crisis  & diabetes

## 2024-07-18 ENCOUNTER — FOLLOWUP TELEPHONE ENCOUNTER (OUTPATIENT)
Dept: DIABETES SERVICES | Age: 64
End: 2024-07-18

## 2024-07-18 NOTE — PROGRESS NOTES
activity. 2 [x] All     []  []    []  []  4 4/15/24 KMS  Stays active, but does not engage in aerobic activity. Encouraged to work his way up to 30 minutes, 5 days per week, as able.    Incorporating nutritional management into lifestyle:   -Describe effect of type, amount & timing of food on blood glucose  -Describe methods for preparing and planning   healthy meals  -Correctly read food labels for nutritional values  -Name 3 foods high in Carbohydrate  -Plan a sample 4 carbohydrate-controlled meal using Diabetes Plate Method  -Verbalized ability to measure and count carbohydrate gram servings using food labels and carbohydrate food list.    -Plan a carbohydrate-controlled meal based on individual needs/preferences from a Registered Dietitian.   2 [x] All       []    []    []  []      []        []  4 4/15/24 KMS  Tries to be mindful of what he eats most of the time. Drinks mostly water, black coffee, and alcohol. Focused on the plate method for portion control. Patient will consider coming back for a future meeting with the dietitian for individualized meal planning.                    Developing strategies for problem solving to promote health/change behavior.  -Identify 7 self-care behaviors; Personal health risk factors; Benefits, challenges & strategies for behavioral change and set an individualized goal selection. 1   [x]  4 4/15/24 KMS  [x]Nutrition: use the plate method for portion control.   []Monitoring  []Exercise  []Medication  []Other     Identified Barriers to learning/adherence to self management plan:    None  []  other    Instruction Method:  Lecture/Discussion and Handouts    Supporting Education Materials/Equipment Provided: Self-management manual and Glucose Meter   []Urdu materials       [] services     []How to Plan Healthy Meals Using the Mediterranean Diet Book  []Other:    Encounter Type Date Attended Start Time End Time Comments  DSMES/MNT units charged Next appointment: